# Patient Record
Sex: FEMALE | Race: WHITE | URBAN - METROPOLITAN AREA
[De-identification: names, ages, dates, MRNs, and addresses within clinical notes are randomized per-mention and may not be internally consistent; named-entity substitution may affect disease eponyms.]

---

## 2024-04-05 LAB
ABO, EXTERNAL RESULT: NORMAL
C. TRACHOMATIS, EXTERNAL RESULT: NEGATIVE
HEP B, EXTERNAL RESULT: NEGATIVE
HIV, EXTERNAL RESULT: NORMAL
N. GONORRHOEAE, EXTERNAL RESULT: NEGATIVE
RH FACTOR, EXTERNAL RESULT: POSITIVE
RUBELLA TITER, EXTERNAL RESULT: NORMAL

## 2024-08-06 ENCOUNTER — HOSPITAL ENCOUNTER (OUTPATIENT)
Age: 34
Discharge: HOME | End: 2024-08-06
Attending: OBSTETRICS & GYNECOLOGY | Admitting: OBSTETRICS & GYNECOLOGY
Payer: COMMERCIAL

## 2024-08-06 VITALS
DIASTOLIC BLOOD PRESSURE: 76 MMHG | TEMPERATURE: 98.1 F | RESPIRATION RATE: 20 BRPM | OXYGEN SATURATION: 100 % | SYSTOLIC BLOOD PRESSURE: 124 MMHG | HEART RATE: 88 BPM

## 2024-08-06 LAB
ABO + RH BLD: NORMAL
BLOOD GROUP ANTIBODIES SERPL: NORMAL
ERYTHROCYTE [DISTWIDTH] IN BLOOD BY AUTOMATED COUNT: 13.8 % (ref 11.9–14.6)
HCT VFR BLD AUTO: 36.3 % (ref 35.8–46.3)
HGB BLD-MCNC: 11.9 G/DL (ref 11.7–15.4)
MCH RBC QN AUTO: 27.8 PG (ref 26.1–32.9)
MCHC RBC AUTO-ENTMCNC: 32.8 G/DL (ref 31.4–35)
MCV RBC AUTO: 84.8 FL (ref 82–102)
NRBC # BLD: 0 K/UL (ref 0–0.2)
PLATELET # BLD AUTO: 305 K/UL (ref 150–450)
PMV BLD AUTO: 10.1 FL (ref 9.4–12.3)
RBC # BLD AUTO: 4.28 M/UL (ref 4.05–5.2)
SPECIMEN EXP DATE BLD: NORMAL
T PALLIDUM AB SER QL IA: NONREACTIVE
WBC # BLD AUTO: 10.1 K/UL (ref 4.3–11.1)

## 2024-08-06 PROCEDURE — 6360000002 HC RX W HCPCS

## 2024-08-06 PROCEDURE — 96361 HYDRATE IV INFUSION ADD-ON: CPT

## 2024-08-06 PROCEDURE — 2500000003 HC RX 250 WO HCPCS: Performed by: OBSTETRICS & GYNECOLOGY

## 2024-08-06 PROCEDURE — 86780 TREPONEMA PALLIDUM: CPT

## 2024-08-06 PROCEDURE — 96375 TX/PRO/DX INJ NEW DRUG ADDON: CPT

## 2024-08-06 PROCEDURE — 6360000002 HC RX W HCPCS: Performed by: OBSTETRICS & GYNECOLOGY

## 2024-08-06 PROCEDURE — 59025 FETAL NON-STRESS TEST: CPT

## 2024-08-06 PROCEDURE — 86850 RBC ANTIBODY SCREEN: CPT

## 2024-08-06 PROCEDURE — 59412 ANTEPARTUM MANIPULATION: CPT

## 2024-08-06 PROCEDURE — 86900 BLOOD TYPING SEROLOGIC ABO: CPT

## 2024-08-06 PROCEDURE — 86901 BLOOD TYPING SEROLOGIC RH(D): CPT

## 2024-08-06 PROCEDURE — 96372 THER/PROPH/DIAG INJ SC/IM: CPT

## 2024-08-06 PROCEDURE — 96374 THER/PROPH/DIAG INJ IV PUSH: CPT

## 2024-08-06 PROCEDURE — 85027 COMPLETE CBC AUTOMATED: CPT

## 2024-08-06 PROCEDURE — 36415 COLL VENOUS BLD VENIPUNCTURE: CPT

## 2024-08-06 RX ORDER — TERBUTALINE SULFATE 1 MG/ML
0.25 INJECTION, SOLUTION SUBCUTANEOUS ONCE
Status: COMPLETED | OUTPATIENT
Start: 2024-08-06 | End: 2024-08-06

## 2024-08-06 RX ORDER — SODIUM CHLORIDE, SODIUM LACTATE, POTASSIUM CHLORIDE, CALCIUM CHLORIDE 600; 310; 30; 20 MG/100ML; MG/100ML; MG/100ML; MG/100ML
INJECTION, SOLUTION INTRAVENOUS CONTINUOUS
Status: DISCONTINUED | OUTPATIENT
Start: 2024-08-06 | End: 2024-08-06 | Stop reason: HOSPADM

## 2024-08-06 RX ORDER — MORPHINE SULFATE 2 MG/ML
2 INJECTION, SOLUTION INTRAMUSCULAR; INTRAVENOUS ONCE
Status: COMPLETED | OUTPATIENT
Start: 2024-08-06 | End: 2024-08-06

## 2024-08-06 RX ORDER — ONDANSETRON 2 MG/ML
4 INJECTION INTRAMUSCULAR; INTRAVENOUS ONCE
Status: COMPLETED | OUTPATIENT
Start: 2024-08-06 | End: 2024-08-06

## 2024-08-06 RX ORDER — ESCITALOPRAM OXALATE 20 MG/1
TABLET ORAL DAILY
COMMUNITY

## 2024-08-06 RX ADMIN — ONDANSETRON 4 MG: 2 INJECTION INTRAMUSCULAR; INTRAVENOUS at 13:06

## 2024-08-06 RX ADMIN — MORPHINE SULFATE 2 MG: 2 INJECTION, SOLUTION INTRAMUSCULAR; INTRAVENOUS at 13:07

## 2024-08-06 RX ADMIN — SODIUM CHLORIDE, SODIUM LACTATE, POTASSIUM CHLORIDE, CALCIUM CHLORIDE: 600; 310; 30; 20 INJECTION, SOLUTION INTRAVENOUS at 11:45

## 2024-08-06 RX ADMIN — TERBUTALINE SULFATE 0.25 MG: 1 INJECTION, SOLUTION SUBCUTANEOUS at 13:09

## 2024-08-06 NOTE — OP NOTE
External Cephalic Version Procedure Note     Indications: Chris breech presentation    Pre-operative Diagnosis: Chris Breech Presentation, GDM, CHTN     Post-operative Diagnosis: Same as above including s/p unsuccessful attempt at external cephalic version     Surgeon: Zoila Hicks MD     Assistants: Freda Decker MD     Anesthesia: None     Procedure Details   The patient was seen in labor and delivery. The risks, benefits, complications, treatment options, and expected outcomes were discussed with the patient.  The patient concurred with the proposed plan, giving informed consent. An ultrasound was performed to confirm fetus in chris breech presentation, head on right. Prior to procedure, a NST was performed which was reactive. Terbutaline was administered prior to procedure. Morphine was administered as well to assist with pain control. Again, an ultrasound was performed to identify fetal head and buttocks. With assistance from Dr. Decker, the fetal head and rump had gentle pressure applied to them with attempt at forward roll. The upper body and head moved to left side of abdomen, but buttocks unable to be elevated out of the pelvis to complete roll despite multiple attempts. FHR and location checked during procedure and noted to be reassuring. After multiple attempts, version was deemed to be unsuccessful with continued breech presentation affirmed at the end of the case. Fetal heart rate noted to be within normal limits.      The patient was monitored for at least 30 minutes post-procedure.      Findings:  Pre-procedure - chris breech presentation; post-procedure - chris breech presentation     Complications:  None; patient tolerated the procedure well.           Disposition: Discharged home after period of extended monitoring           Condition: stable

## 2024-08-06 NOTE — H&P
History & Physical    Name: Maris Gage MRN: [id number]  SSN: [id number]    YOB: 1990  Age: 34 y.o.  Sex: female      Subjective:     Estimated Date of Delivery: None noted.  OB History    Para Term  AB Living   1             SAB IAB Ectopic Molar Multiple Live Births                    # Outcome Date GA Lbr Yann/2nd Weight Sex Delivery Anes PTL Lv   1 Current                Ms. Gage is admitted for attempt at ECV, known breech presentation. Pregnancy further complicated by GDM and hypertension. She reports + fetal movement, no obstetric concerns. Denies any bleeding, abnormal discharge, or cramping. Fetus known to be in chris breech presentation.     Past Medical History:   Diagnosis Date    Abnormal Pap smear of cervix     Diabetes mellitus (HCC)     Hypertension     Mental disorder      Past Surgical History:   Procedure Laterality Date    MOUTH SURGERY       Social History     Occupational History    Not on file   Tobacco Use    Smoking status: Never     Passive exposure: Never    Smokeless tobacco: Never   Vaping Use    Vaping Use: Never used   Substance and Sexual Activity    Alcohol use: Not Currently    Drug use: Never    Sexual activity: Yes     History reviewed. No pertinent family history.    Allergies   Allergen Reactions    Dilaudid [Hydromorphone Hcl] Itching     Prior to Admission medications    Medication Sig Start Date End Date Taking? Authorizing Provider   metFORMIN (GLUCOPHAGE) 500 MG tablet Take 1 tablet by mouth 2 times daily (with meals)   Yes Julia Espino MD   Prenatal Vit w/Ep-Rwqlmbvvi-BQ (PNV PO) Take by mouth   Yes Julia Espino MD   escitalopram (LEXAPRO) 20 MG tablet Take by mouth daily   Yes Julia Espino MD   Ferrous Sulfate (SLOW FE PO) Take by mouth   Yes Julia Espino MD        Review of Systems: A comprehensive review of systems was negative except for that written in the History of Present Illness.    Objective:      Vitals:  Vitals:    08/06/24 1129   BP: 127/85   Pulse: 99   Resp: 12   Temp: 98.1 °F (36.7 °C)   TempSrc: Oral   SpO2: 97%        Physical Exam:  General: AAO x 3, no acute complaints  Resp: Non-labored respirations, symmetric expansion  Abd: Soft, gravid, non-tender  Membranes:  Intact  Fetal Heart Rate: Reactive  Baseline: 135 beats per minute  Variability: moderate  Accelerations: yes  Decelerations: none        [place]: No contractions on tocometer    Prenatal Labs:   No results found for: \"ABORH\", \"RUBELLAEXT\", \"HBSAGEXT\", \"HIVEXT\", \"RPREXT\", \"GONNOEXT\"    Impression/Plan:     Principal Problem:    Breech presentation  Resolved Problems:    * No resolved hospital problems. *       Plan: Admit for ECV. R/B/A discussed with patient and consents signed. Plan on morphine 2 mg x 1 as well as terbutaline.

## 2024-08-06 NOTE — PROGRESS NOTES
Dr. Hicks and Dr. Decker at bedside at 1308.   Zofran, morphine, and terbutaline given per orders. See MAR.   Procedure started at 1312.   ECV unsuccessful. Attempt stopped at 1322.  EFM/toco reapplied and tracing well. FHR reassuring.   Patient tolerated well.

## 2024-08-06 NOTE — DISCHARGE INSTRUCTIONS
Section: Before Your Surgery  What is a  section?     A  section, or , is surgery to deliver your baby through a cut the doctor makes in your lower belly and uterus. This cut is also called an incision. In many cases, the doctor makes the cut just above the pubic hairline. In other cases, it runs from the belly button to the pubic hairline. Both cuts leave a scar. It most often fades with time.  The surgery may be done while you are awake but your belly is numb. This lets you be awake for the birth of your baby. Less often, women need general anesthesia. This means you are asleep during the surgery.  Most women go home about 3 days after the birth. You may feel better each day. But you will likely need about 6 weeks to fully recover. During the first few weeks you will need extra help with household chores. But you will be able to care for your baby. You can do things like breastfeed and change diapers.  How do you prepare for surgery?  Surgery can be stressful. This information will help you understand what you can expect. And it will help you safely prepare for surgery.  Preparing for surgery    Be sure you have someone to take you home. Anesthesia and pain medicine will make it unsafe for you to drive or get home on your own.     Understand exactly what surgery is planned, along with the risks, benefits, and other options.     Tell your doctor ALL the medicines, vitamins, supplements, and herbal remedies you take. Some may increase the risk of problems during your surgery. Your doctor will tell you if you should stop taking any of them before the surgery and how soon to do it.     If you take a medicine that prevents blood clots, your doctor may tell you to stop taking it before your surgery. Or your doctor may tell you to keep taking it. (These medicines include aspirin and other blood thinners.) Make sure that you understand exactly what your doctor wants you to do.      the birth. Then it will be ready to go when you are.     Learn about breastfeeding.  For example, find out about ways to hold your baby to make breastfeeding easier. And think about learning how to pump and store milk.     Know that crying is normal.  It's common for babies to cry 1 to 3 hours a day. Some cry more, and some cry less.     Learn why babies cry.  They may be hungry; have gas; need a diaper change; or feel cold, warm, tired, lonely, or tense. Sometimes they cry for unknown reasons.     Think about what will help you stay calm when your baby cries.  Taking slow, deep breaths can help. So can taking a break. It's okay to put your baby somewhere safe (like their crib) and walk away for a few minutes.     Learn about safe sleep for your baby.  Always put your baby to sleep on their back. Place them alone in a crib or bassinet with a firm, flat surface. Keep soft items like stuffed animals out of the crib.     Learn what to expect with  poop.  Your baby will have their own bowel patterns. Some babies have several bowel movements a day. Some have fewer.     Know that  babies will often have loose, yellow bowel movements.  Formula-fed babies have more formed stools. If your baby's poop looks like pellets, your baby is constipated.   Follow-up care is a key part of your treatment and safety. Be sure to make and go to all appointments, and call your doctor if you are having problems. It's also a good idea to know your test results and keep a list of the medicines you take.  Where can you learn more?  Go to https://www.[employer].net/patientEd and enter N257 to learn more about \"Week 37 of Your Pregnancy: Care Instructions.\"  Current as of: July 10, 2023  Content Version: 14.1  © 1349-6923 [employer].   Care instructions adapted under license by [employer]. If you have questions about a medical condition or this instruction, always ask your healthcare professional. [place],

## 2024-08-06 NOTE — PROGRESS NOTES
Discharge orders received. EFM/toco removed. IV removed.   Discharge instructions given to patient and significant other. Understanding verbalized.    Sent home in stable condition with family at side.

## 2024-08-08 ENCOUNTER — HOSPITAL ENCOUNTER (INPATIENT)
Age: 34
LOS: 3 days | Discharge: HOME | End: 2024-08-11
Attending: OBSTETRICS & GYNECOLOGY | Admitting: OBSTETRICS & GYNECOLOGY
Payer: COMMERCIAL

## 2024-08-08 ENCOUNTER — ANESTHESIA (OUTPATIENT)
Dept: OPERATING ROOM | Age: 34
End: 2024-08-08
Payer: COMMERCIAL

## 2024-08-08 ENCOUNTER — ANESTHESIA EVENT (OUTPATIENT)
Dept: OPERATING ROOM | Age: 34
End: 2024-08-08
Payer: COMMERCIAL

## 2024-08-08 PROBLEM — O14.93 PREECLAMPSIA, THIRD TRIMESTER: Status: ACTIVE | Noted: 2024-08-08

## 2024-08-08 LAB
ALBUMIN SERPL-MCNC: 2.5 G/DL (ref 3.5–5)
ALBUMIN/GLOB SERPL: 0.8 (ref 1–1.9)
ALP SERPL-CCNC: 132 U/L (ref 35–104)
ALT SERPL-CCNC: 13 U/L (ref 12–65)
ANION GAP SERPL CALC-SCNC: 17 MMOL/L (ref 9–18)
AST SERPL-CCNC: 15 U/L (ref 15–37)
BASOPHILS # BLD: 0 K/UL (ref 0–0.2)
BASOPHILS NFR BLD: 0 % (ref 0–2)
BILIRUB SERPL-MCNC: <0.2 MG/DL (ref 0–1.2)
BUN SERPL-MCNC: 7 MG/DL (ref 6–23)
CALCIUM SERPL-MCNC: 8.6 MG/DL (ref 8.8–10.2)
CHLORIDE SERPL-SCNC: 104 MMOL/L (ref 98–107)
CO2 SERPL-SCNC: 17 MMOL/L (ref 20–28)
CREAT SERPL-MCNC: 0.41 MG/DL (ref 0.6–1.1)
CREAT UR-MCNC: 70.4 MG/DL (ref 28–217)
DIFFERENTIAL METHOD BLD: ABNORMAL
EOSINOPHIL # BLD: 0.1 K/UL (ref 0–0.8)
EOSINOPHIL NFR BLD: 1 % (ref 0.5–7.8)
ERYTHROCYTE [DISTWIDTH] IN BLOOD BY AUTOMATED COUNT: 13.8 % (ref 11.9–14.6)
GLOBULIN SER CALC-MCNC: 3.1 G/DL (ref 2.3–3.5)
GLUCOSE SERPL-MCNC: 108 MG/DL (ref 70–99)
HCT VFR BLD AUTO: 35.9 % (ref 35.8–46.3)
HGB BLD-MCNC: 11.7 G/DL (ref 11.7–15.4)
IMM GRANULOCYTES # BLD AUTO: 0.1 K/UL (ref 0–0.5)
IMM GRANULOCYTES NFR BLD AUTO: 1 % (ref 0–5)
LYMPHOCYTES # BLD: 1.2 K/UL (ref 0.5–4.6)
LYMPHOCYTES NFR BLD: 10 % (ref 13–44)
MCH RBC QN AUTO: 27.5 PG (ref 26.1–32.9)
MCHC RBC AUTO-ENTMCNC: 32.6 G/DL (ref 31.4–35)
MCV RBC AUTO: 84.5 FL (ref 82–102)
MONOCYTES # BLD: 0.7 K/UL (ref 0.1–1.3)
MONOCYTES NFR BLD: 6 % (ref 4–12)
NEUTS SEG # BLD: 9 K/UL (ref 1.7–8.2)
NEUTS SEG NFR BLD: 82 % (ref 43–78)
NRBC # BLD: 0 K/UL (ref 0–0.2)
PLATELET # BLD AUTO: 270 K/UL (ref 150–450)
PMV BLD AUTO: 9.7 FL (ref 9.4–12.3)
POTASSIUM SERPL-SCNC: 3.9 MMOL/L (ref 3.5–5.1)
PROT SERPL-MCNC: 5.5 G/DL (ref 6.3–8.2)
PROT UR-MCNC: 19 MG/DL
PROT/CREAT UR-RTO: 0.3
RBC # BLD AUTO: 4.25 M/UL (ref 4.05–5.2)
SODIUM SERPL-SCNC: 138 MMOL/L (ref 136–145)
T PALLIDUM AB SER QL IA: NONREACTIVE
URATE SERPL-MCNC: 4.1 MG/DL (ref 2.5–7.1)
WBC # BLD AUTO: 11 K/UL (ref 4.3–11.1)

## 2024-08-08 PROCEDURE — 6360000002 HC RX W HCPCS: Performed by: OBSTETRICS & GYNECOLOGY

## 2024-08-08 PROCEDURE — 84156 ASSAY OF PROTEIN URINE: CPT

## 2024-08-08 PROCEDURE — 6370000000 HC RX 637 (ALT 250 FOR IP): Performed by: ANESTHESIOLOGY

## 2024-08-08 PROCEDURE — 84550 ASSAY OF BLOOD/URIC ACID: CPT

## 2024-08-08 PROCEDURE — 2500000003 HC RX 250 WO HCPCS: Performed by: OBSTETRICS & GYNECOLOGY

## 2024-08-08 PROCEDURE — 6360000002 HC RX W HCPCS: Performed by: NURSE ANESTHETIST, CERTIFIED REGISTERED

## 2024-08-08 PROCEDURE — 2709999900 HC NON-CHARGEABLE SUPPLY: Performed by: OBSTETRICS & GYNECOLOGY

## 2024-08-08 PROCEDURE — 3700000000 HC ANESTHESIA ATTENDED CARE: Performed by: OBSTETRICS & GYNECOLOGY

## 2024-08-08 PROCEDURE — 6360000002 HC RX W HCPCS: Performed by: ANESTHESIOLOGY

## 2024-08-08 PROCEDURE — 80053 COMPREHEN METABOLIC PANEL: CPT

## 2024-08-08 PROCEDURE — 4A1HXCZ MONITORING OF PRODUCTS OF CONCEPTION, CARDIAC RATE, EXTERNAL APPROACH: ICD-10-PCS | Performed by: OBSTETRICS & GYNECOLOGY

## 2024-08-08 PROCEDURE — 2500000003 HC RX 250 WO HCPCS: Performed by: NURSE ANESTHETIST, CERTIFIED REGISTERED

## 2024-08-08 PROCEDURE — 2580000003 HC RX 258: Performed by: OBSTETRICS & GYNECOLOGY

## 2024-08-08 PROCEDURE — 86780 TREPONEMA PALLIDUM: CPT

## 2024-08-08 PROCEDURE — 7100000001 HC PACU RECOVERY - ADDTL 15 MIN: Performed by: OBSTETRICS & GYNECOLOGY

## 2024-08-08 PROCEDURE — 3700000001 HC ADD 15 MINUTES (ANESTHESIA): Performed by: OBSTETRICS & GYNECOLOGY

## 2024-08-08 PROCEDURE — 1100000000 HC RM PRIVATE

## 2024-08-08 PROCEDURE — 85025 COMPLETE CBC W/AUTO DIFF WBC: CPT

## 2024-08-08 PROCEDURE — 82570 ASSAY OF URINE CREATININE: CPT

## 2024-08-08 PROCEDURE — 6370000000 HC RX 637 (ALT 250 FOR IP): Performed by: OBSTETRICS & GYNECOLOGY

## 2024-08-08 PROCEDURE — 2580000003 HC RX 258: Performed by: NURSE ANESTHETIST, CERTIFIED REGISTERED

## 2024-08-08 PROCEDURE — 99465 NB RESUSCITATION: CPT

## 2024-08-08 PROCEDURE — 3609079900 HC CESAREAN SECTION: Performed by: OBSTETRICS & GYNECOLOGY

## 2024-08-08 PROCEDURE — 6360000002 HC RX W HCPCS

## 2024-08-08 PROCEDURE — 7100000000 HC PACU RECOVERY - FIRST 15 MIN: Performed by: OBSTETRICS & GYNECOLOGY

## 2024-08-08 RX ORDER — SODIUM CHLORIDE 0.9 % (FLUSH) 0.9 %
10 SYRINGE (ML) INJECTION PRN
Status: DISCONTINUED | OUTPATIENT
Start: 2024-08-08 | End: 2024-08-09

## 2024-08-08 RX ORDER — OXYCODONE HYDROCHLORIDE 5 MG/1
5 TABLET ORAL EVERY 4 HOURS PRN
Status: DISCONTINUED | OUTPATIENT
Start: 2024-08-08 | End: 2024-08-09

## 2024-08-08 RX ORDER — EPHEDRINE SULFATE/0.9% NACL/PF 50 MG/5 ML
SYRINGE (ML) INTRAVENOUS PRN
Status: DISCONTINUED | OUTPATIENT
Start: 2024-08-08 | End: 2024-08-08 | Stop reason: SDUPTHER

## 2024-08-08 RX ORDER — CITRIC ACID/SODIUM CITRATE 334-500MG
30 SOLUTION, ORAL ORAL ONCE
Status: COMPLETED | OUTPATIENT
Start: 2024-08-08 | End: 2024-08-08

## 2024-08-08 RX ORDER — MORPHINE SULFATE 1 MG/ML
INJECTION, SOLUTION EPIDURAL; INTRATHECAL; INTRAVENOUS
Status: COMPLETED | OUTPATIENT
Start: 2024-08-08 | End: 2024-08-08

## 2024-08-08 RX ORDER — ACETAMINOPHEN 325 MG/1
650 TABLET ORAL EVERY 4 HOURS PRN
Status: DISCONTINUED | OUTPATIENT
Start: 2024-08-08 | End: 2024-08-09

## 2024-08-08 RX ORDER — ACETAMINOPHEN 325 MG/1
975 TABLET ORAL ONCE
Status: COMPLETED | OUTPATIENT
Start: 2024-08-08 | End: 2024-08-08

## 2024-08-08 RX ORDER — ONDANSETRON 2 MG/ML
4 INJECTION INTRAMUSCULAR; INTRAVENOUS EVERY 6 HOURS PRN
Status: DISCONTINUED | OUTPATIENT
Start: 2024-08-08 | End: 2024-08-09

## 2024-08-08 RX ORDER — SODIUM CHLORIDE 0.9 % (FLUSH) 0.9 %
5-40 SYRINGE (ML) INJECTION EVERY 12 HOURS SCHEDULED
Status: DISCONTINUED | OUTPATIENT
Start: 2024-08-08 | End: 2024-08-09

## 2024-08-08 RX ORDER — SODIUM CHLORIDE, SODIUM LACTATE, POTASSIUM CHLORIDE, CALCIUM CHLORIDE 600; 310; 30; 20 MG/100ML; MG/100ML; MG/100ML; MG/100ML
INJECTION, SOLUTION INTRAVENOUS CONTINUOUS PRN
Status: DISCONTINUED | OUTPATIENT
Start: 2024-08-08 | End: 2024-08-08 | Stop reason: SDUPTHER

## 2024-08-08 RX ORDER — NALBUPHINE HYDROCHLORIDE 10 MG/ML
5 INJECTION, SOLUTION INTRAMUSCULAR; INTRAVENOUS; SUBCUTANEOUS EVERY 4 HOURS PRN
Status: DISCONTINUED | OUTPATIENT
Start: 2024-08-08 | End: 2024-08-09

## 2024-08-08 RX ORDER — KETOROLAC TROMETHAMINE 30 MG/ML
INJECTION, SOLUTION INTRAMUSCULAR; INTRAVENOUS PRN
Status: DISCONTINUED | OUTPATIENT
Start: 2024-08-08 | End: 2024-08-08 | Stop reason: SDUPTHER

## 2024-08-08 RX ORDER — HYDROMORPHONE HYDROCHLORIDE 1 MG/ML
0.25 INJECTION, SOLUTION INTRAMUSCULAR; INTRAVENOUS; SUBCUTANEOUS EVERY 6 HOURS PRN
Status: DISCONTINUED | OUTPATIENT
Start: 2024-08-08 | End: 2024-08-09

## 2024-08-08 RX ORDER — ONDANSETRON 2 MG/ML
4 INJECTION INTRAMUSCULAR; INTRAVENOUS ONCE
Status: COMPLETED | OUTPATIENT
Start: 2024-08-08 | End: 2024-08-08

## 2024-08-08 RX ORDER — KETOROLAC TROMETHAMINE 30 MG/ML
15 INJECTION, SOLUTION INTRAMUSCULAR; INTRAVENOUS EVERY 6 HOURS PRN
Status: DISCONTINUED | OUTPATIENT
Start: 2024-08-08 | End: 2024-08-09

## 2024-08-08 RX ORDER — OXYCODONE HYDROCHLORIDE 5 MG/1
10 TABLET ORAL EVERY 4 HOURS PRN
Status: DISCONTINUED | OUTPATIENT
Start: 2024-08-08 | End: 2024-08-09

## 2024-08-08 RX ORDER — BUPIVACAINE HYDROCHLORIDE 7.5 MG/ML
INJECTION, SOLUTION INTRASPINAL
Status: COMPLETED | OUTPATIENT
Start: 2024-08-08 | End: 2024-08-08

## 2024-08-08 RX ORDER — NALOXONE HYDROCHLORIDE 0.4 MG/ML
INJECTION, SOLUTION INTRAMUSCULAR; INTRAVENOUS; SUBCUTANEOUS PRN
Status: DISCONTINUED | OUTPATIENT
Start: 2024-08-08 | End: 2024-08-09

## 2024-08-08 RX ORDER — PROCHLORPERAZINE EDISYLATE 5 MG/ML
INJECTION INTRAMUSCULAR; INTRAVENOUS PRN
Status: DISCONTINUED | OUTPATIENT
Start: 2024-08-08 | End: 2024-08-08 | Stop reason: SDUPTHER

## 2024-08-08 RX ORDER — SODIUM CHLORIDE 9 MG/ML
INJECTION, SOLUTION INTRAVENOUS PRN
Status: DISCONTINUED | OUTPATIENT
Start: 2024-08-08 | End: 2024-08-09

## 2024-08-08 RX ORDER — SODIUM CHLORIDE, SODIUM LACTATE, POTASSIUM CHLORIDE, AND CALCIUM CHLORIDE .6; .31; .03; .02 G/100ML; G/100ML; G/100ML; G/100ML
1000 INJECTION, SOLUTION INTRAVENOUS ONCE
Status: DISCONTINUED | OUTPATIENT
Start: 2024-08-08 | End: 2024-08-09

## 2024-08-08 RX ORDER — SODIUM CHLORIDE, SODIUM LACTATE, POTASSIUM CHLORIDE, CALCIUM CHLORIDE 600; 310; 30; 20 MG/100ML; MG/100ML; MG/100ML; MG/100ML
INJECTION, SOLUTION INTRAVENOUS CONTINUOUS
Status: DISCONTINUED | OUTPATIENT
Start: 2024-08-08 | End: 2024-08-09

## 2024-08-08 RX ADMIN — KETOROLAC TROMETHAMINE 30 MG: 30 INJECTION, SOLUTION INTRAMUSCULAR; INTRAVENOUS at 14:36

## 2024-08-08 RX ADMIN — FAMOTIDINE 20 MG: 10 INJECTION, SOLUTION INTRAVENOUS at 13:26

## 2024-08-08 RX ADMIN — HYDROMORPHONE HYDROCHLORIDE 0.25 MG: 1 INJECTION, SOLUTION INTRAMUSCULAR; INTRAVENOUS; SUBCUTANEOUS at 16:18

## 2024-08-08 RX ADMIN — Medication 10 MG: at 14:22

## 2024-08-08 RX ADMIN — Medication 10 MG: at 14:31

## 2024-08-08 RX ADMIN — MORPHINE SULFATE 0.15 MG: 1 INJECTION, SOLUTION EPIDURAL; INTRATHECAL; INTRAVENOUS at 13:54

## 2024-08-08 RX ADMIN — SODIUM CHLORIDE, POTASSIUM CHLORIDE, SODIUM LACTATE AND CALCIUM CHLORIDE: 600; 310; 30; 20 INJECTION, SOLUTION INTRAVENOUS at 15:38

## 2024-08-08 RX ADMIN — PHENYLEPHRINE HYDROCHLORIDE 100 MCG: 0.1 INJECTION, SOLUTION INTRAVENOUS at 14:31

## 2024-08-08 RX ADMIN — Medication 500 ML/HR: at 14:13

## 2024-08-08 RX ADMIN — PHENYLEPHRINE HYDROCHLORIDE 100 MCG: 0.1 INJECTION, SOLUTION INTRAVENOUS at 14:06

## 2024-08-08 RX ADMIN — PHENYLEPHRINE HYDROCHLORIDE 100 MCG: 0.1 INJECTION, SOLUTION INTRAVENOUS at 14:23

## 2024-08-08 RX ADMIN — PHENYLEPHRINE HYDROCHLORIDE 100 MCG: 0.1 INJECTION, SOLUTION INTRAVENOUS at 14:20

## 2024-08-08 RX ADMIN — PHENYLEPHRINE HYDROCHLORIDE 100 MCG: 0.1 INJECTION, SOLUTION INTRAVENOUS at 14:02

## 2024-08-08 RX ADMIN — SODIUM CHLORIDE, SODIUM LACTATE, POTASSIUM CHLORIDE, AND CALCIUM CHLORIDE: 600; 310; 30; 20 INJECTION, SOLUTION INTRAVENOUS at 13:49

## 2024-08-08 RX ADMIN — SODIUM CHLORIDE, SODIUM LACTATE, POTASSIUM CHLORIDE, AND CALCIUM CHLORIDE 1000 ML: .6; .31; .03; .02 INJECTION, SOLUTION INTRAVENOUS at 11:17

## 2024-08-08 RX ADMIN — ACETAMINOPHEN 975 MG: 325 TABLET, FILM COATED ORAL at 13:25

## 2024-08-08 RX ADMIN — Medication 10 MG: at 14:06

## 2024-08-08 RX ADMIN — CEFAZOLIN 2000 MG: 10 INJECTION, POWDER, FOR SOLUTION INTRAVENOUS at 13:44

## 2024-08-08 RX ADMIN — PHENYLEPHRINE HYDROCHLORIDE 100 MCG: 0.1 INJECTION, SOLUTION INTRAVENOUS at 14:16

## 2024-08-08 RX ADMIN — Medication 10 MG: at 14:13

## 2024-08-08 RX ADMIN — ONDANSETRON 4 MG: 2 INJECTION INTRAMUSCULAR; INTRAVENOUS at 13:26

## 2024-08-08 RX ADMIN — PHENYLEPHRINE HYDROCHLORIDE 100 MCG: 0.1 INJECTION, SOLUTION INTRAVENOUS at 14:09

## 2024-08-08 RX ADMIN — ACETAMINOPHEN 650 MG: 325 TABLET, FILM COATED ORAL at 21:18

## 2024-08-08 RX ADMIN — PHENYLEPHRINE HYDROCHLORIDE 100 MCG: 0.1 INJECTION, SOLUTION INTRAVENOUS at 14:00

## 2024-08-08 RX ADMIN — Medication 10 MG: at 14:00

## 2024-08-08 RX ADMIN — KETOROLAC TROMETHAMINE 15 MG: 30 INJECTION, SOLUTION INTRAMUSCULAR; INTRAVENOUS at 21:17

## 2024-08-08 RX ADMIN — PHENYLEPHRINE HYDROCHLORIDE 100 MCG: 0.1 INJECTION, SOLUTION INTRAVENOUS at 14:28

## 2024-08-08 RX ADMIN — PHENYLEPHRINE HYDROCHLORIDE 100 MCG: 0.1 INJECTION, SOLUTION INTRAVENOUS at 14:12

## 2024-08-08 RX ADMIN — SODIUM CITRATE AND CITRIC ACID MONOHYDRATE 30 ML: 334; 500 SOLUTION ORAL at 13:26

## 2024-08-08 RX ADMIN — PROCHLORPERAZINE EDISYLATE 5 MG: 5 INJECTION INTRAMUSCULAR; INTRAVENOUS at 14:14

## 2024-08-08 RX ADMIN — BUPIVACAINE HYDROCHLORIDE IN DEXTROSE 13.5 MG: 7.5 INJECTION, SOLUTION SUBARACHNOID at 13:54

## 2024-08-08 RX ADMIN — OXYCODONE 10 MG: 5 TABLET ORAL at 18:25

## 2024-08-08 ASSESSMENT — PAIN DESCRIPTION - ORIENTATION: ORIENTATION: MID

## 2024-08-08 ASSESSMENT — PAIN SCALES - GENERAL
PAINLEVEL_OUTOF10: 5
PAINLEVEL_OUTOF10: 6

## 2024-08-08 ASSESSMENT — PAIN DESCRIPTION - LOCATION
LOCATION: ABDOMEN
LOCATION: ABDOMEN

## 2024-08-08 ASSESSMENT — PAIN DESCRIPTION - DESCRIPTORS: DESCRIPTORS: ACHING

## 2024-08-08 NOTE — ANESTHESIA PRE PROCEDURE
Department of Anesthesiology  Preprocedure Note       Name:  Maris Gage   Age:  34 y.o.  :  1990                                          MRN:  [id number]         Date:  2024      Surgeon: Surgeon(s):  Kareem Barnard MD    Procedure: Procedure(s):   SECTION    Medications prior to admission:   Prior to Admission medications    Medication Sig Start Date End Date Taking? Authorizing Provider   metFORMIN (GLUCOPHAGE) 500 MG tablet Take 1 tablet by mouth 2 times daily (with meals)  Patient not taking: Reported on 2024    ProviderJulia MD   Prenatal Vit w/Ex-Lsnxmndwu-XP (PNV PO) Take by mouth    Julia Espino MD   escitalopram (LEXAPRO) 20 MG tablet Take by mouth daily    Julia Espino MD   Ferrous Sulfate (SLOW FE PO) Take by mouth    Julia Espino MD       Current medications:    Current Facility-Administered Medications   Medication Dose Route Frequency Provider Last Rate Last Admin    lactated ringers bolus 1,000 mL  1,000 mL IntraVENous Once Jason Jaquez MD        sodium chloride flush 0.9 % injection 5-40 mL  5-40 mL IntraVENous 2 times per day Jason Jaquez MD        sodium chloride flush 0.9 % injection 10 mL  10 mL IntraVENous PRN Jason Jaquez MD        0.9 % sodium chloride infusion   IntraVENous PRN Jason Jaquez MD        famotidine (PEPCID) 20 mg in sodium chloride (PF) 0.9 % 10 mL injection  20 mg IntraVENous Once Jason Jaquez MD        acetaminophen (TYLENOL) tablet 975 mg  975 mg Oral Once Jason Jaquez MD        ceFAZolin (ANCEF) 2000 mg in sterile water 20 mL IV syringe  2,000 mg IntraVENous Once Jason Jaquez MD           Allergies:    Allergies   Allergen Reactions    Dilaudid [Hydromorphone Hcl] Itching       Problem List:    Patient Active Problem List   Diagnosis Code    Breech presentation O32.1XX0    Preeclampsia, third trimester O14.93       Past Medical History:        Diagnosis Date    Abnormal Pap smear of cervix

## 2024-08-08 NOTE — H&P
History & Physical    Name: Maris Gage MRN: [id number]  SSN: [id number]    YOB: 1990  Age: 34 y.o.  Sex: female      Subjective:     Estimated Date of Delivery: 24  OB History    Para Term  AB Living   1             SAB IAB Ectopic Molar Multiple Live Births                    # Outcome Date GA Lbr Yann/2nd Weight Sex Delivery Anes PTL Lv   1 Current                Ms. Gage admitted with pregnancy at 37w3d for  section due to superimposed PIH. Prenatal course was complicated by diabetes - gestational. Please see prenatal records for details. Of note Maris had BARRAZA and vision changes yesterday that resolved but c/o nausea today.    Past Medical History:   Diagnosis Date    Abnormal Pap smear of cervix     Diabetes mellitus (HCC)     Hypertension     Mental disorder      Past Surgical History:   Procedure Laterality Date    MOUTH SURGERY       Social History     Occupational History    Not on file   Tobacco Use    Smoking status: Never     Passive exposure: Never    Smokeless tobacco: Never   Vaping Use    Vaping Use: Never used   Substance and Sexual Activity    Alcohol use: Not Currently    Drug use: Never    Sexual activity: Yes     History reviewed. No pertinent family history.    Allergies   Allergen Reactions    Dilaudid [Hydromorphone Hcl] Itching     Prior to Admission medications    Medication Sig Start Date End Date Taking? Authorizing Provider   metFORMIN (GLUCOPHAGE) 500 MG tablet Take 1 tablet by mouth 2 times daily (with meals)    Julia Espino MD   Prenatal Vit w/Re-Oqoxexelr-CQ (PNV PO) Take by mouth    Julia Espino MD   escitalopram (LEXAPRO) 20 MG tablet Take by mouth daily    Julia Espino MD   Ferrous Sulfate (SLOW FE PO) Take by mouth    ProviderJulia MD        Review of Systems: A comprehensive review of systems was negative except for that written in the History of Present Illness.    Objective:     Vitals:  Vitals:

## 2024-08-08 NOTE — ANESTHESIA PROCEDURE NOTES
Spinal Block    Patient location during procedure: OB  End time: 8/8/2024 1:56 PM  Reason for block: primary anesthetic and at surgeon's request  Staffing  Performed: anesthesiologist   Anesthesiologist: Ama Nevarez MD  Performed by: Ama Nevarez MD  Authorized by: Ama Nevarez MD    Spinal Block  Patient position: sitting  Prep: ChloraPrep  Patient monitoring: cardiac monitor, continuous pulse ox, frequent blood pressure checks and oxygen  Location: L4/L5  Provider prep: mask and sterile gloves  Needle  Needle type: Pencan   Needle gauge: 25 G  Needle length: 3.5 in  Assessment  Swirl obtained: Yes  CSF: clear  Attempts: 1  Preanesthetic Checklist  Completed: patient identified, IV checked, site marked, risks and benefits discussed, surgical/procedural consents, equipment checked, pre-op evaluation, timeout performed, anesthesia consent given, oxygen available and monitors applied/VS acknowledged

## 2024-08-08 NOTE — PROGRESS NOTES
SBAR OUT Report: Mother    Verbal report given to Zee Troncoso RN on this patient, who is now being transferred to [facility] (unit) for routine progression of patient care.   The patient is  wearing a green \"Anesthesia-Duramorph\" band.    Report consisted of patient's Situation, Background, Assessment and Recommendations (SBAR).       [place] ID bands were compared with the identification form, and verified with the patient and receiving nurse.    Information from the ED SBAR and the [place] Report was reviewed with the receiving nurse; opportunity for questions and clarification provided.

## 2024-08-08 NOTE — PROGRESS NOTES
Pt to L&D with reports of decreased fetal movement. Pt denies vaginal bleeding, loss of fluid, ctx, H/A, and blurry vision. Pt denies good fetal movement. EFM and TOCO applied to a soft, non-tender abd. MD notified.

## 2024-08-08 NOTE — L&D DELIVERY NOTE
Delivery of Alejandra, weight 7-2    Too, Baby GIRL Maris [[id number]]      Labor Events     Labor: No   Steroids: None  Cervical Ripening Date/Time:      Antibiotics Received during Labor: No  Rupture Date/Time:  24 14:12:10   Rupture Type: Intact, AROM  Fluid Color: Meconium  Meconium Consistency: Thin  Fluid Odor: None  Fluid Volume: None  Induction: None  Augmentation: None  Labor Complications: None              Anesthesia    Method: Spinal       Labor Length    3rd stage: 0h 00m       Delivery Details      Delivery Date: 24 Delivery Time: 14:12:28   Delivery Type: , Low Transverse  Trial of Labor?: No   Categorization: Primary   Priority: unscheduled  Indications for : Breech       Skin Incision Type: Pfannenstiel  Uterine Incision: Low Transverse       [place] Presentation    Presentation: Breech       Shoulder Dystocia    Shoulder Dystocia Present?: No       Assisted Delivery Details    Forceps Attempted?: No  Vacuum Extractor Attempted?: No                           Cord    Vessels: 3 Vessels  Complications: None  Cord Clamped Date/Time: 2024 14:13:00  Cord Blood Disposition: Lab  Gases Sent?: No              Placenta    Date/Time: 2024 14:13:00  Removal: Manual Removal  Appearance: Intact  Disposition: Discarded       Lacerations           Vaginal Counts    Initial Count Personnel: N/A  Initial Count Verified By: N/A  Final Count Personnel: N/A  Final Count Verified By: N/A       Blood Loss  Mother: Maris Gage #[id number]     Start of Mother's Information      Delivery Blood Loss  24 1350 - 24 1501      None                 End of Mother's Information  Mother: Maris Gage #[id number]                Delivery Providers    Delivering clinician: Kareem Barnard MD     Provider Role    Kareem Barnard MD Obstetrician    Claudia Betancourt, CARIDAD Primary Nurse    Amelia Woodard RN Primary  Nurse    Eric Bates

## 2024-08-09 ENCOUNTER — ANESTHESIA EVENT (OUTPATIENT)
Dept: MOTHER INFANT UNIT | Age: 34
End: 2024-08-09
Payer: COMMERCIAL

## 2024-08-09 ENCOUNTER — ANESTHESIA (OUTPATIENT)
Dept: MOTHER INFANT UNIT | Age: 34
End: 2024-08-09
Payer: COMMERCIAL

## 2024-08-09 LAB
ERYTHROCYTE [DISTWIDTH] IN BLOOD BY AUTOMATED COUNT: 14.1 % (ref 11.9–14.6)
HCT VFR BLD AUTO: 30.2 % (ref 35.8–46.3)
HGB BLD-MCNC: 9.5 G/DL (ref 11.7–15.4)
MCH RBC QN AUTO: 27.3 PG (ref 26.1–32.9)
MCHC RBC AUTO-ENTMCNC: 31.5 G/DL (ref 31.4–35)
MCV RBC AUTO: 86.8 FL (ref 82–102)
NRBC # BLD: 0 K/UL (ref 0–0.2)
PLATELET # BLD AUTO: 193 K/UL (ref 150–450)
PMV BLD AUTO: 9.5 FL (ref 9.4–12.3)
RBC # BLD AUTO: 3.48 M/UL (ref 4.05–5.2)
WBC # BLD AUTO: 6.2 K/UL (ref 4.3–11.1)

## 2024-08-09 PROCEDURE — 85027 COMPLETE CBC AUTOMATED: CPT

## 2024-08-09 PROCEDURE — 1100000000 HC RM PRIVATE

## 2024-08-09 PROCEDURE — 6370000000 HC RX 637 (ALT 250 FOR IP): Performed by: OBSTETRICS & GYNECOLOGY

## 2024-08-09 PROCEDURE — 6360000002 HC RX W HCPCS: Performed by: ANESTHESIOLOGY

## 2024-08-09 PROCEDURE — 36415 COLL VENOUS BLD VENIPUNCTURE: CPT

## 2024-08-09 PROCEDURE — 6370000000 HC RX 637 (ALT 250 FOR IP): Performed by: ANESTHESIOLOGY

## 2024-08-09 RX ORDER — SODIUM CHLORIDE 9 MG/ML
INJECTION, SOLUTION INTRAVENOUS PRN
Status: DISCONTINUED | OUTPATIENT
Start: 2024-08-09 | End: 2024-08-09

## 2024-08-09 RX ORDER — SODIUM CHLORIDE 0.9 % (FLUSH) 0.9 %
5-40 SYRINGE (ML) INJECTION EVERY 12 HOURS SCHEDULED
Status: DISCONTINUED | OUTPATIENT
Start: 2024-08-09 | End: 2024-08-09

## 2024-08-09 RX ORDER — MIDAZOLAM HYDROCHLORIDE 2 MG/2ML
2 INJECTION, SOLUTION INTRAMUSCULAR; INTRAVENOUS
Status: DISCONTINUED | OUTPATIENT
Start: 2024-08-09 | End: 2024-08-09

## 2024-08-09 RX ORDER — SODIUM CHLORIDE, SODIUM LACTATE, POTASSIUM CHLORIDE, CALCIUM CHLORIDE 600; 310; 30; 20 MG/100ML; MG/100ML; MG/100ML; MG/100ML
INJECTION, SOLUTION INTRAVENOUS CONTINUOUS
Status: DISCONTINUED | OUTPATIENT
Start: 2024-08-09 | End: 2024-08-09

## 2024-08-09 RX ORDER — SODIUM CHLORIDE 0.9 % (FLUSH) 0.9 %
5-40 SYRINGE (ML) INJECTION EVERY 12 HOURS SCHEDULED
Status: DISCONTINUED | OUTPATIENT
Start: 2024-08-09 | End: 2024-08-11 | Stop reason: HOSPADM

## 2024-08-09 RX ORDER — FAMOTIDINE 20 MG/1
20 TABLET, FILM COATED ORAL ONCE
Status: DISCONTINUED | OUTPATIENT
Start: 2024-08-09 | End: 2024-08-09

## 2024-08-09 RX ORDER — LANOLIN
CREAM (ML) TOPICAL
Status: DISCONTINUED | OUTPATIENT
Start: 2024-08-09 | End: 2024-08-11 | Stop reason: HOSPADM

## 2024-08-09 RX ORDER — SODIUM CHLORIDE 9 MG/ML
INJECTION, SOLUTION INTRAVENOUS PRN
Status: DISCONTINUED | OUTPATIENT
Start: 2024-08-09 | End: 2024-08-11 | Stop reason: HOSPADM

## 2024-08-09 RX ORDER — DIPHENHYDRAMINE HCL 25 MG
25 CAPSULE ORAL EVERY 6 HOURS PRN
Status: DISCONTINUED | OUTPATIENT
Start: 2024-08-09 | End: 2024-08-11 | Stop reason: HOSPADM

## 2024-08-09 RX ORDER — ACETAMINOPHEN 500 MG
1000 TABLET ORAL ONCE
Status: DISCONTINUED | OUTPATIENT
Start: 2024-08-09 | End: 2024-08-09

## 2024-08-09 RX ORDER — LIDOCAINE HYDROCHLORIDE 10 MG/ML
1 INJECTION, SOLUTION INFILTRATION; PERINEURAL
Status: DISCONTINUED | OUTPATIENT
Start: 2024-08-09 | End: 2024-08-09

## 2024-08-09 RX ORDER — SODIUM CHLORIDE 0.9 % (FLUSH) 0.9 %
5-40 SYRINGE (ML) INJECTION PRN
Status: DISCONTINUED | OUTPATIENT
Start: 2024-08-09 | End: 2024-08-09

## 2024-08-09 RX ORDER — DOCUSATE SODIUM 100 MG/1
100 CAPSULE, LIQUID FILLED ORAL 2 TIMES DAILY
Status: DISCONTINUED | OUTPATIENT
Start: 2024-08-09 | End: 2024-08-11 | Stop reason: HOSPADM

## 2024-08-09 RX ORDER — ONDANSETRON 2 MG/ML
4 INJECTION INTRAMUSCULAR; INTRAVENOUS EVERY 6 HOURS PRN
Status: DISCONTINUED | OUTPATIENT
Start: 2024-08-09 | End: 2024-08-11 | Stop reason: HOSPADM

## 2024-08-09 RX ORDER — IBUPROFEN 800 MG/1
800 TABLET ORAL EVERY 8 HOURS
Status: DISCONTINUED | OUTPATIENT
Start: 2024-08-09 | End: 2024-08-11 | Stop reason: HOSPADM

## 2024-08-09 RX ORDER — IBUPROFEN 800 MG/1
800 TABLET ORAL EVERY 8 HOURS
Status: DISCONTINUED | OUTPATIENT
Start: 2024-08-10 | End: 2024-08-09

## 2024-08-09 RX ORDER — ONDANSETRON 2 MG/ML
4 INJECTION INTRAMUSCULAR; INTRAVENOUS EVERY 6 HOURS PRN
Status: DISCONTINUED | OUTPATIENT
Start: 2024-08-09 | End: 2024-08-09

## 2024-08-09 RX ORDER — SODIUM CHLORIDE 9 MG/ML
INJECTION, SOLUTION INTRAVENOUS CONTINUOUS
Status: DISCONTINUED | OUTPATIENT
Start: 2024-08-09 | End: 2024-08-09

## 2024-08-09 RX ORDER — OXYCODONE HYDROCHLORIDE 5 MG/1
5 TABLET ORAL EVERY 4 HOURS PRN
Status: DISCONTINUED | OUTPATIENT
Start: 2024-08-09 | End: 2024-08-11 | Stop reason: HOSPADM

## 2024-08-09 RX ORDER — KETOROLAC TROMETHAMINE 30 MG/ML
30 INJECTION, SOLUTION INTRAMUSCULAR; INTRAVENOUS EVERY 6 HOURS
Status: DISCONTINUED | OUTPATIENT
Start: 2024-08-09 | End: 2024-08-09

## 2024-08-09 RX ORDER — FENTANYL CITRATE 50 UG/ML
100 INJECTION, SOLUTION INTRAMUSCULAR; INTRAVENOUS
Status: DISCONTINUED | OUTPATIENT
Start: 2024-08-09 | End: 2024-08-09

## 2024-08-09 RX ORDER — ONDANSETRON 4 MG/1
4 TABLET, ORALLY DISINTEGRATING ORAL EVERY 8 HOURS PRN
Status: DISCONTINUED | OUTPATIENT
Start: 2024-08-09 | End: 2024-08-11 | Stop reason: HOSPADM

## 2024-08-09 RX ORDER — PRENATAL VIT/IRON FUM/FOLIC AC 27MG-0.8MG
1 TABLET ORAL DAILY
Status: DISCONTINUED | OUTPATIENT
Start: 2024-08-09 | End: 2024-08-11 | Stop reason: HOSPADM

## 2024-08-09 RX ORDER — SODIUM CHLORIDE 0.9 % (FLUSH) 0.9 %
5-40 SYRINGE (ML) INJECTION PRN
Status: DISCONTINUED | OUTPATIENT
Start: 2024-08-09 | End: 2024-08-11 | Stop reason: HOSPADM

## 2024-08-09 RX ORDER — SODIUM CHLORIDE, SODIUM LACTATE, POTASSIUM CHLORIDE, CALCIUM CHLORIDE 600; 310; 30; 20 MG/100ML; MG/100ML; MG/100ML; MG/100ML
INJECTION, SOLUTION INTRAVENOUS CONTINUOUS
Status: DISCONTINUED | OUTPATIENT
Start: 2024-08-09 | End: 2024-08-11 | Stop reason: HOSPADM

## 2024-08-09 RX ORDER — OXYCODONE HYDROCHLORIDE 5 MG/1
10 TABLET ORAL EVERY 4 HOURS PRN
Status: DISCONTINUED | OUTPATIENT
Start: 2024-08-09 | End: 2024-08-11 | Stop reason: HOSPADM

## 2024-08-09 RX ORDER — ACETAMINOPHEN 500 MG
1000 TABLET ORAL EVERY 6 HOURS SCHEDULED
Status: DISCONTINUED | OUTPATIENT
Start: 2024-08-09 | End: 2024-08-11 | Stop reason: HOSPADM

## 2024-08-09 RX ADMIN — KETOROLAC TROMETHAMINE 15 MG: 30 INJECTION, SOLUTION INTRAMUSCULAR; INTRAVENOUS at 13:05

## 2024-08-09 RX ADMIN — OXYCODONE 10 MG: 5 TABLET ORAL at 18:27

## 2024-08-09 RX ADMIN — OXYCODONE 5 MG: 5 TABLET ORAL at 08:51

## 2024-08-09 RX ADMIN — IBUPROFEN 800 MG: 800 TABLET, FILM COATED ORAL at 21:24

## 2024-08-09 RX ADMIN — OXYCODONE 10 MG: 5 TABLET ORAL at 22:00

## 2024-08-09 RX ADMIN — KETOROLAC TROMETHAMINE 15 MG: 30 INJECTION, SOLUTION INTRAMUSCULAR; INTRAVENOUS at 05:53

## 2024-08-09 RX ADMIN — OXYCODONE 10 MG: 5 TABLET ORAL at 14:47

## 2024-08-09 RX ADMIN — OXYCODONE 5 MG: 5 TABLET ORAL at 10:02

## 2024-08-09 RX ADMIN — ACETAMINOPHEN 650 MG: 325 TABLET, FILM COATED ORAL at 01:07

## 2024-08-09 RX ADMIN — OXYCODONE 5 MG: 5 TABLET ORAL at 01:07

## 2024-08-09 RX ADMIN — ACETAMINOPHEN 1000 MG: 500 TABLET, FILM COATED ORAL at 18:26

## 2024-08-09 RX ADMIN — ACETAMINOPHEN 650 MG: 325 TABLET, FILM COATED ORAL at 05:53

## 2024-08-09 RX ADMIN — DOCUSATE SODIUM 100 MG: 100 CAPSULE, LIQUID FILLED ORAL at 21:24

## 2024-08-09 ASSESSMENT — PAIN SCALES - GENERAL
PAINLEVEL_OUTOF10: 7
PAINLEVEL_OUTOF10: 8
PAINLEVEL_OUTOF10: 7
PAINLEVEL_OUTOF10: 7
PAINLEVEL_OUTOF10: 5

## 2024-08-09 ASSESSMENT — PAIN DESCRIPTION - LOCATION
LOCATION: ABDOMEN
LOCATION: ABDOMEN

## 2024-08-09 NOTE — LACTATION NOTE
Flange fit can be an important part of comfortable and efficient pumping.   Standard 24mm flanges may be ok, but new information suggests using a breastshield (flange) closest to your nipple diameter measurement maybe best.  Nipple diameter can fluctuate throughout breastfeeding duration.  Suggest ordering a flange insert variety pack (any brand).  The variety pack contains flange inserts in  13mm, 15mm, 17mm, 19mm and 21mm sizes.  Flange inserts will fit in ANY 24 mm hard flange no matter what brand the pump is.  This includes plug-in and wireless pumps.      ____ mm L     ____ mm R.       Based on your above measurements (exact measurement/+1-2 mm), suggest trying ____ mm flanges to start.  Some people prefer a hard sided flange.  Once you feel you have found your ideal fit, you can order a hard sided flange compatible with your pump.  You will likely have to order off brand and depending on your pump, a hard flange in a smaller size may not be available.  Olive oil or coconut oil can be used before pumping to help with friction.  Be very aware of centering nipple in the flange opening.  With a tighter fit it can take practice to get nipple centered properly.     Call [facility] with any questions.  [phone]    Wireless pumps are usually best for once your milk is already in.  Fast cycling stimulates let-down, slow cycling expresses available milk.  Always adjust vacuum (pull strength)  level to comfort.  Push the vacuum (+/-) level up until pull is a little uncomfortable and then back down until comfortable.  Pain does not equal milk.  Start pumping in the fast cycle for about 2 minutes to stimulate let-down.  Switch to longer pull expression mode after 2 minutes.  Majority of time should be in slower Expression Mode.  There are multiple settings that can be utilized with wireless pump.  These are the standard instructions.  [facility] [phone]      For  additional, brand specific, wireless pump information please check out instructional videos from [employer].  Search your pump brand to see instructions for use, tips and cleaning.

## 2024-08-09 NOTE — LACTATION NOTE
Lactation visit. First time mom. Infant in [facility] for hypoglycemia. Mom due to pump now. Full review given on pump parts and pump settings. Measured nipple diameter for flange fit at mom's request.  16mm L and 18mm R.  Discussed standard flanges may be ok, but new information states closer flange fit can be more comfortable and effective.  Mom can try flanges that came with pump and adjust as indicated.  Nipple diameter can fluctuate throughout breastfeeding duration.  Suggest trying 18 or 21mm flanges to start if standard 24mm do not work. Mom has 18 and 21mm flanges to try. Pump every 3 hours. Reviewed hands on pumping. Mom pumped ~2ml this time, reviewed collection and proper labeling for [facility] into syringes. Reviewed normal volume expectations with pumping. Can attempt latching as able. May take baby some time to learn to latch well. Post attempt, need to continue to pump and feed pumped milk plus formula supplementation as instructed by [facility]. Mom agreeable. All questions answered. Will continue to assist.

## 2024-08-09 NOTE — PROGRESS NOTES
Patient rodríguez removed at this time.  Alissa care provided.  Up to bedside and ambulated around the room.  Tolerated well.  Returned to bed.  Reminded to call for any needs and to attempt to void in the next few hours.  Patient acknowledged understanding.

## 2024-08-09 NOTE — PROGRESS NOTES
Progress Note                               Patient: Maris Gage MRN: [id number]  SSN: [id number]    YOB: 1990  Age: 34 y.o.  Sex: female      1 Day Post-Op     Subjective:     Doing well this morning. Overnight, baby had to [facility] to [facility] due to low sugars. She is tearful but understanding of situation. She is pumping and working on colostrum for baby. She denies any heavy bleeding. She has ambulated to restroom and voided. She reports pain is controlled. No further headache, visual disturbance.     Objective:     Patient Vitals for the past 18 hrs:   Temp Pulse Resp BP SpO2   24 0716 98.6 °F (37 °C) 96 18 (!) 116/95 99 %   24 0335 97.9 °F (36.6 °C) 96 18 111/66 98 %   24 2357 97.5 °F (36.4 °C) (!) 101 18 (!) 100/58 98 %   24 1911 98.8 °F (37.1 °C) 89 18 119/77 97 %   24 1812 97.7 °F (36.5 °C) 93 20 (!) 106/59 95 %   24 1715 -- 90 18 (!) 108/59 99 %   24 1700 -- 92 18 (!) 113/59 99 %   24 1645 -- 90 18 104/60 97 %   24 1629 -- (!) 103 18 100/62 99 %   24 1625 -- 82 18 107/60 99 %   24 1609 -- 100 18 (!) 105/59 99 %   24 1557 -- 71 18 (!) 103/59 96 %   24 1545 -- 92 18 101/60 96 %   24 1540 -- (!) 103 18 (!) 102/58 97 %   24 1535 -- 100 18 (!) 112/55 97 %   24 1530 -- (!) 101 -- (!) 107/55 96 %   24 1525 -- 100 18 (!) 112/59 92 %   24 1520 -- 97 18 (!) 111/58 92 %   24 1513 -- (!) 109 20 (!) 97/55 93 %   24 1511 98.1 °F (36.7 °C) (!) 109 20 (!) 125/44 95 %       Temp (24hrs), Av.1 °F (36.7 °C), Min:97.5 °F (36.4 °C), Max:98.8 °F (37.1 °C)      Physical Exam:    General: AAO x 3, no acute distress  Resp: Clear to auscultation  CV: Regular rate and rhythm  Abd: Soft, appropriately tender to palpation, fundus firm and below umbilicus  Inc: C/d/I, no warmth or erythema surrounding  Ext: No edema present     Lab/Data Review:  Post-op Hgb pending this AM    Assessment and Plan:     -

## 2024-08-09 NOTE — OP NOTE
[facility] [address]  [address]                            OPERATIVE REPORT      PATIENT NAME: ROSALINDA PHELPS                 : 1990  MED REC NO: [id number]                       ROOM: [id number]  ACCOUNT NO: [id number]                       ADMIT DATE: 2024  PROVIDER: Kareem Barnard MD    DATE OF SERVICE:  2024    PREOPERATIVE DIAGNOSES:  37+ week intrauterine pregnancy with onset of preeclampsia, breech presentation.    POSTOPERATIVE DIAGNOSES:  37+ week intrauterine pregnancy with onset of preeclampsia, breech presentation with an operative delivery at 1412 on 2024 with the delivery of a female infant with Apgars 8 and 8, weighing 3232 g (7 pounds 2 ounces).    PROCEDURES PERFORMED:  Primary low-transverse .    SURGEON:  Kareem Barnard MD    ASSISTANT:  OR staff.    ANESTHESIA:  Spinal.    ESTIMATED BLOOD LOSS:  700 mL.    SPECIMENS REMOVED:  None.    INTRAOPERATIVE FINDINGS:  Normal uterus, tubes, and ovaries and no reason for breech presentation palpated within the uterine cavity.     COMPLICATIONS:  None.    IMPLANTS:  None.      DESCRIPTION OF PROCEDURE:  After informed consent was obtained, the patient was taken to the OR and good working level obtained on her spinal.  She was prepped and draped in usual sterile fashion.  Time-out performed.  The patient did receive her Ancef.  Once noting good working level on her spinal, she was prepped and draped.  Pfannenstiel skin incision was made, carried down to the fascia, nicked in midline, extended out bilaterally, and dissected superiorly and inferiorly off the rectus muscles.  All bleeding controlled with electrocautery.  The peritoneum was then identified and elevated between two hemostats and then excised and extended up superiorly and down inferiorly.  A bladder flap was created.  Hysterotomy incision was then made with use of sharp and then blunt

## 2024-08-09 NOTE — ANESTHESIA POST-OP
Patient is POD 1 s/p  and received neuraxial morphine for post-op pain control.  /66   Pulse 96   Temp 97.9 °F (36.6 °C) (Oral)   Resp 18   Ht 1.626 m (5' 4\")   SpO2 98%   Breastfeeding Unknown , airway patent, patient appropriately hydrated and appears euvolemic.  She reports minimal incisional pain.  Patient reports minimal pruritis and minimal nausea.  Her lower extremities have returned to baseline neurologically.  She was satisfied with the anesthetic and reports no complications.  Continue current orders.

## 2024-08-10 PROCEDURE — 6370000000 HC RX 637 (ALT 250 FOR IP): Performed by: OBSTETRICS & GYNECOLOGY

## 2024-08-10 PROCEDURE — 1100000000 HC RM PRIVATE

## 2024-08-10 RX ORDER — IBUPROFEN 200 MG
CAPSULE ORAL 2 TIMES DAILY
Status: DISCONTINUED | OUTPATIENT
Start: 2024-08-10 | End: 2024-08-11 | Stop reason: HOSPADM

## 2024-08-10 RX ORDER — IBUPROFEN 200 MG
TABLET ORAL 2 TIMES DAILY
Status: DISCONTINUED | OUTPATIENT
Start: 2024-08-10 | End: 2024-08-10 | Stop reason: SDUPTHER

## 2024-08-10 RX ADMIN — OXYCODONE 10 MG: 5 TABLET ORAL at 05:56

## 2024-08-10 RX ADMIN — ACETAMINOPHEN 1000 MG: 500 TABLET, FILM COATED ORAL at 18:25

## 2024-08-10 RX ADMIN — OXYCODONE 10 MG: 5 TABLET ORAL at 10:29

## 2024-08-10 RX ADMIN — OXYCODONE 10 MG: 5 TABLET ORAL at 22:12

## 2024-08-10 RX ADMIN — DOCUSATE SODIUM 100 MG: 100 CAPSULE, LIQUID FILLED ORAL at 22:11

## 2024-08-10 RX ADMIN — POLYMYXIN B SULFATE, BACITRACIN ZINC, NEOMYCIN SULFATE: 5000; 3.5; 4 OINTMENT TOPICAL at 22:20

## 2024-08-10 RX ADMIN — IBUPROFEN 800 MG: 800 TABLET, FILM COATED ORAL at 22:11

## 2024-08-10 RX ADMIN — ACETAMINOPHEN 1000 MG: 500 TABLET, FILM COATED ORAL at 02:19

## 2024-08-10 RX ADMIN — OXYCODONE 10 MG: 5 TABLET ORAL at 14:17

## 2024-08-10 RX ADMIN — OXYCODONE 10 MG: 5 TABLET ORAL at 02:20

## 2024-08-10 RX ADMIN — DOCUSATE SODIUM 100 MG: 100 CAPSULE, LIQUID FILLED ORAL at 10:29

## 2024-08-10 RX ADMIN — OXYCODONE 10 MG: 5 TABLET ORAL at 18:26

## 2024-08-10 RX ADMIN — POLYMYXIN B SULFATE, BACITRACIN ZINC, NEOMYCIN SULFATE: 5000; 3.5; 4 OINTMENT TOPICAL at 13:34

## 2024-08-10 RX ADMIN — IBUPROFEN 800 MG: 800 TABLET, FILM COATED ORAL at 05:56

## 2024-08-10 RX ADMIN — IBUPROFEN 800 MG: 800 TABLET, FILM COATED ORAL at 14:17

## 2024-08-10 RX ADMIN — ACETAMINOPHEN 1000 MG: 500 TABLET, FILM COATED ORAL at 10:28

## 2024-08-10 ASSESSMENT — PAIN DESCRIPTION - DESCRIPTORS
DESCRIPTORS: BURNING;SHARP
DESCRIPTORS: SHARP;BURNING

## 2024-08-10 ASSESSMENT — PAIN DESCRIPTION - LOCATION
LOCATION: ABDOMEN
LOCATION: ABDOMEN

## 2024-08-10 ASSESSMENT — PAIN DESCRIPTION - ORIENTATION
ORIENTATION: LOWER
ORIENTATION: LOWER

## 2024-08-10 ASSESSMENT — PAIN SCALES - GENERAL
PAINLEVEL_OUTOF10: 8
PAINLEVEL_OUTOF10: 8

## 2024-08-10 NOTE — LACTATION NOTE
This note was copied from a baby's chart.  In to follow up with mom and infant. Mom was pumping and stated that she felt the suction was not as strong on the right side. Did trouble shoot and reconnected the tubing. With no change. Did change to a new pump. Mom stated that she thought the suction did improve. Mom does continue to offer infant the breast prior to pumping and is offering formula supplement. Lactation consultant will follow up tomorrow.

## 2024-08-10 NOTE — PROGRESS NOTES
Progress Note                               Patient: Maris Gage MRN: [id number]  SSN: [id number]    YOB: 1990  Age: 34 y.o.  Sex: female      2 Days Post-Op     Subjective:     Doing better this morning. She reports that pain is controlled. Area of skin disruption from tape that is uncomfortable. She denies any heavy bleeding. She is passing flatus. She denies any headache, visual disturbance.     Objective:     Patient Vitals for the past 18 hrs:   Temp Pulse Resp BP SpO2   08/10/24 1100 97.6 °F (36.4 °C) 90 16 131/89 99 %   08/10/24 0725 97.6 °F (36.4 °C) 95 16 108/81 96 %   08/10/24 0254 97.5 °F (36.4 °C) 79 16 110/76 96 %   24 2335 98.1 °F (36.7 °C) 91 18 122/88 97 %   24 1913 98.2 °F (36.8 °C) 100 18 109/86 97 %       Temp (24hrs), Av.8 °F (36.6 °C), Min:97.5 °F (36.4 °C), Max:98.2 °F (36.8 °C)      Physical Exam:    General: AAO x 3, no acute distress  Resp: Clear to auscultation  CV: Regular rate and rhythm  Abd: Soft, appropriately tender to palpation, fundus firm and below umbilicus  Inc: c/d/I. Area of skin disruption left mid-abdomen with 3-4 mm  Ext: No edema present       Lab/Data Review:  No new labs this AM    Assessment and Plan:     - Continue routine postoperative care  - Baby at bedside, stepped down from NICU yesterday  - Ointment ordered for skin disruption  - Anticipate d/c home tomorrow, POD 3

## 2024-08-10 NOTE — LACTATION NOTE
This note was copied from a baby's chart.  Mom asking about a nipple shield. Reviewed with her the recommendation of when to introduce the shield. Informed her that if infant is still not latching when she goes to the breast feeding center they will probably introduce the shield at that time.

## 2024-08-11 VITALS
DIASTOLIC BLOOD PRESSURE: 84 MMHG | HEIGHT: 64 IN | HEART RATE: 80 BPM | SYSTOLIC BLOOD PRESSURE: 117 MMHG | OXYGEN SATURATION: 99 % | TEMPERATURE: 97.7 F | RESPIRATION RATE: 16 BRPM

## 2024-08-11 PROCEDURE — 6370000000 HC RX 637 (ALT 250 FOR IP): Performed by: OBSTETRICS & GYNECOLOGY

## 2024-08-11 RX ORDER — OXYCODONE HYDROCHLORIDE 5 MG/1
5 TABLET ORAL EVERY 6 HOURS PRN
Qty: 28 TABLET | Refills: 0 | Status: SHIPPED | OUTPATIENT
Start: 2024-08-11 | End: 2024-08-18

## 2024-08-11 RX ORDER — PSEUDOEPHEDRINE HCL 30 MG
100 TABLET ORAL 2 TIMES DAILY
Qty: 60 CAPSULE | Refills: 1 | Status: SHIPPED | OUTPATIENT
Start: 2024-08-11 | End: 2024-09-10

## 2024-08-11 RX ORDER — IBUPROFEN 800 MG/1
800 TABLET ORAL EVERY 8 HOURS
Qty: 30 TABLET | Refills: 0 | Status: SHIPPED | OUTPATIENT
Start: 2024-08-11 | End: 2024-08-21

## 2024-08-11 RX ADMIN — IBUPROFEN 800 MG: 800 TABLET, FILM COATED ORAL at 05:34

## 2024-08-11 RX ADMIN — POLYMYXIN B SULFATE, BACITRACIN ZINC, NEOMYCIN SULFATE: 5000; 3.5; 4 OINTMENT TOPICAL at 10:14

## 2024-08-11 RX ADMIN — OXYCODONE 10 MG: 5 TABLET ORAL at 05:35

## 2024-08-11 RX ADMIN — DOCUSATE SODIUM 100 MG: 100 CAPSULE, LIQUID FILLED ORAL at 08:46

## 2024-08-11 RX ADMIN — ACETAMINOPHEN 1000 MG: 500 TABLET, FILM COATED ORAL at 01:26

## 2024-08-11 RX ADMIN — OXYCODONE 10 MG: 5 TABLET ORAL at 10:00

## 2024-08-11 RX ADMIN — OXYCODONE 10 MG: 5 TABLET ORAL at 01:26

## 2024-08-11 RX ADMIN — ACETAMINOPHEN 1000 MG: 500 TABLET, FILM COATED ORAL at 08:46

## 2024-08-11 NOTE — LACTATION NOTE
This note was copied from a baby's chart.  In to see mom and infant for discharge. Gave feeding plan for guidance at home. They verbalized understanding how to use. Completed hospital grade pump rental. Assisted her w/ trying to latch baby to both sides but infant could not stay on as mom has short nipple and baby appears to want more milk flow. Discussed trying nipple shield at first pediatrician/lactation appt at [address] if still having trouble latching next few days, encouraged feed and weigh there. Reviewed discharge info and how to manage period of engorgement. No further needs at this time.

## 2024-08-11 NOTE — DISCHARGE SUMMARY
Obstetrical Discharge Summary     Name: Maris Gage MRN: [id number]  SSN: [id number]    YOB: 1990  Age: 34 y.o.  Sex: female      Allergies: Dilaudid [hydromorphone hcl]    Admit Date: 2024    Discharge Date: 2024     Admitting Physician: Jason Jaquez MD     Attending Physician:  Kareem Barnard MD     * Admission Diagnoses: Preeclampsia, third trimester [O14.93]  Breech presentation on examination, other fetus [O32.1XX9]    * Discharge Diagnoses:   Information for the patient's :  Too, Baby GIRL Maris [[id number]]   @[id number]@     Additional Diagnoses:    Lab Results   Component Value Date/Time    ABORH A POSITIVE 2024 12:51 PM      Immunization History   Administered Date(s) Administered    TDaP, ADACEL (age 10y-64y), BOOSTRIX (age 10y+), IM, 0.5mL 2024       * Procedures:   Procedure(s):   SECTION    * Discharge Condition: Improved    * Hospital Course: Normal hospital course following the delivery.    * Disposition: Home    Discharge Medications:      Medication List        START taking these medications      docusate 100 MG Caps  Commonly known as: COLACE, DULCOLAX  Take 100 mg by mouth 2 times daily     ibuprofen 800 MG tablet  Commonly known as: ADVIL;MOTRIN  Take 1 tablet by mouth every 8 (eight) hours for 10 days     oxyCODONE 5 MG immediate release tablet  Commonly known as: ROXICODONE  Take 1 tablet by mouth every 6 hours as needed for Pain for up to 7 days. Max Daily Amount: 20 mg            CONTINUE taking these medications      Lexapro 20 MG tablet  Generic drug: escitalopram     PNV PO     SLOW FE PO            STOP taking these medications      metFORMIN 500 MG tablet  Commonly known as: GLUCOPHAGE               Where to Get Your Medications        These medications were sent to [facility] #[id number] - [place] - [address] [facility] -  [phone] - F [phone]  [address] [place], [place] [address] [address]      Phone: [phone]   docusate 100 MG  Caps  ibuprofen 800 MG tablet  oxyCODONE 5 MG immediate release tablet         * Follow-up Care/Patient Instructions:  Activity: activity as tolerated, no heavy lifting, pushing, pulling for 6 weeks, and no sex for 6 weeks  Diet: regular diet  Wound Care: as directed    Follow-up: 1 week for postpartum BP check      Progress Note                               Patient: Maris Gage MRN: [id number]  SSN: [id number]    YOB: 1990  Age: 34 y.o.  Sex: female      3 Days Post-Op     Subjective:     Doing better this morning. She reports that pain is controlled. Area of skin disruption from tape that is uncomfortable. She denies any heavy bleeding. She is passing flatus. She denies any headache. Seeing occasional floaters, much improved, seldom. No other signs/symptoms of PreE.    Objective:     Patient Vitals for the past 18 hrs:   Temp Pulse Resp BP SpO2   24 0748 97.7 °F (36.5 °C) 80 16 117/84 99 %   24 0305 98.2 °F (36.8 °C) 79 16 112/86 96 %   08/10/24 2300 98.2 °F (36.8 °C) 90 16 133/88 98 %   08/10/24 1904 97.9 °F (36.6 °C) 79 16 116/87 --       Temp (24hrs), Av °F (36.7 °C), Min:97.6 °F (36.4 °C), Max:98.3 °F (36.8 °C)      Physical Exam:    General: AAO x 3, no acute distress  Resp: Clear to auscultation  CV: Regular rate and rhythm  Abd: Soft, appropriately tender to palpation, fundus firm and below umbilicus  Inc: c/d/I. Area of skin disruption left mid-abdomen with 3-4 mm. Mild swelling present inferior to incision.   Ext: No edema present        Lab/Data Review:  No new labs    Assessment and Plan:     Patient appears to be having an uncomplicated post- course. Continue routine postoperative care and maternal education. Plan d/c home today, POD 3. She will follow-up in 1 week for BP check then 2 weeks for post-op incision check. Provided with routine return precautions.

## 2024-08-11 NOTE — LACTATION NOTE
This note was copied from a baby's chart.  Individualized Feeding Plan for Breastfeeding   Lactation Services [phone]    As much as possible, hold your baby on your chest so baby’s bare skin is against your bare skin with a blanket covering baby’s back, especially 30 minutes before it is time for baby to eat.    Watch for early feeding cues such as, licking lips, sucking motions, rooting, hands to mouth. Crying is a late feeding cue.      Feed your baby at least 8 times in 24 hours, or more if your baby is showing feeding cues.  If baby is sleepy put baby skin to skin and watch for hunger cues.  To rouse baby: unwrap, undress, massage hands, feet, & back, change diaper, gently change baby’s position from lying to sitting.   15-20 minutes on the first breast of active breastfeeding is considered a good feeding. Good, active breastfeeding is when baby is alert, tugging the nipple, their ear may move, and you can hear swallows.  Allow baby to finish the first side before changing sides.     Sleeping at the breast or only brief, light sucks should not be considered a good, full breastfeed.  At each feedin.  Baby needs to NURSE WELL x 15-20 minutes on at least first breast, burp and offer 2nd breast at every feeding.  If no sustained latch only attempt at breast for 10 minutes.     If baby does not latch on and feed well on at least one side, you should:      2. Double pump for 15 minutes with breast massage and compression.  Hand express for an additional 2-3 minutes per side. Pump after each feeding attempt or poor feeding, up to 8 times per day. If you are not putting baby to the breast you need to pump 8 times a day. Pump every 3 hours.       3. Give baby all of the breast milk you obtain using a straight syringe or  curved syringe.    If baby does NOT have enough wet and dirty diapers per day, is jaundiced/lethargic, or has significant weight loss AND you do NOT pump enough milk for each feeding  (per volume listed below), formula supplementation may need to be used. Call lactation department /pediatrician if you have concerns.     AVERAGE INTAKES OF COLOSTRUM BY HEALTHY  INFANTS:  Time  Day Intake (ml per feeding)  Based on 8 feedings per day.  1st 24 hrs  1 2-10 ml  24-48 hrs  2 5-15 ml  48-72 hrs  3 15-30 ml (0.5-1 oz)  72-96 hrs  4 30-45 ml (1-1.5oz)                          5-6      45-60 ml (1.5-2oz)                           7          70 ml (2.25oz) + more as desired      By day 7, baby will need 70 ml or 2.25 oz at each feeding based on 8 feedings per day & baby’s weight. (1oz = 30ml).  Total milk volume needed in 24 hours by Day 7 is 17-19 oz per day based on baby's birthweight of 7lb 2oz.   The more often baby eats, the less volume they need per feeding.  If baby is eating more often than the minimum of 8 times per day, they may take less per feeding.    Comments:  Use plan as needed if not latching well.  If giving formula after a breastfeed, will also need to pump for 10 minutes.  If baby does not latch and you just bottle feed, pump for 15 minutes.     Use feeding plan until follow up with pediatrician.      OUTPATIENT APPOINTMENT Suggested.  Outpatient services are located on the 4th floor at [facility]. Check in at the 4th floor registration desk (the same one you used when you came to have your baby).  Call for questions [phone]

## 2024-08-11 NOTE — DISCHARGE INSTRUCTIONS
Discharge instruction to follow:   Activity: Pelvis rest for 6 weeks     No heavy lifting over 15 lbs for 2 weeks     No driving for 2 weeks     No push/pull motion such as sweeping or vacuuming for 2 weeks     No tub baths for 6 weeks     section keep incision clean and dry, may shower as normal with soap and water.   Inspect incision every day for signs of infection listed below.  Continue to use willy-bottle with every void or bowel movement until comfortable stopping.  Change sanitary pad after each urination or bowel movement.    Call MD for the following:      Fever over 101 F; pain not relieved by medication; foul smelling vaginal discharge or increase in vaginal bleeding. Redness, swelling, or drainage from  incision.    Take medication as prescribed. Follow up with MD as order.         Section: What to Expect at Home  Your Recovery     A  section, or , is surgery to deliver your baby through a cut that the doctor makes in your lower belly and uterus. The cut is called an incision.  You may have some pain in your lower belly and need pain medicine for 1 to 2 weeks. You can expect some vaginal bleeding for several weeks. You will probably need about 6 weeks to fully recover.  It's important to take it easy while the incision heals. Avoid heavy lifting, strenuous activities, and exercises that strain the belly muscles while you recover. Ask a family member or friend for help with housework, cooking, and shopping.  This care sheet gives you a general idea about how long it will take for you to recover. But each person recovers at a different pace. Follow the steps below to get better as quickly as possible.  How can you care for yourself at home?  Activity    Rest when you feel tired. Getting enough sleep will help you recover.     Try to walk each day. Start by walking a little more than you did the day before. Bit by bit, increase the amount you walk. Walking boosts

## 2024-08-11 NOTE — CARE COORDINATION
Assessed patient due to hx depression/anxiety.  She is coping well and has all needs.  Spouse present and supportive.  Discussed and provided patient with  informational packet on  mood and anxiety disorders (resources/education).     provided education on [facility]  Home Visit and provided brochure.  Patient declined referral at this time but will consider and call us if she changes her mind.

## (undated) DEVICE — SUTURE MONOCRYL SZ 3-0 L36IN ABSRB UD L36MM CT-1 1/2 CIR Y944H

## (undated) DEVICE — SUTURE MONOCRYL SZ 1 L36IN ABSRB UD L36MM CT-1 1/2 CIR Y947H

## (undated) DEVICE — SUTURE VICRYL SZ 0 L36IN ABSRB UD L48MM CTX 1/2 CIR J978H

## (undated) DEVICE — SUTURE PDS II SZ 0 L27IN ABSRB VLT L36MM CT-1 1/2 CIR Z340H

## (undated) DEVICE — SUTURE PLN GUT SZ 2-0 L27IN ABSRB YELLOWISH TAN L40MM CT 853H

## (undated) DEVICE — SURGICAL PROCEDURE PACK C SECT CDS

## (undated) DEVICE — SOLUTION IRRIG 1000ML H2O STRL BLT

## (undated) DEVICE — AMD ANTIMICROBIAL NON-ADHERENT PAD,0.2% POLYHEXAMETHYLENE BIGUANIDE HCI (PHMB): Brand: TELFA

## (undated) DEVICE — Device: Brand: PORTEX

## (undated) DEVICE — KENDALL SCD EXPRESS SLEEVES, KNEE LENGTH, MEDIUM: Brand: KENDALL SCD

## (undated) DEVICE — ELECTRODE PT RET AD L9FT HI MOIST COND ADH HYDRGEL CORDED

## (undated) DEVICE — TUBING, SUCTION, 1/4" X 10', STRAIGHT: Brand: MEDLINE

## (undated) DEVICE — KIT URIN CATH L16FR BLLN 5ML INDWL STR TIP INF CTRL URIN

## (undated) DEVICE — SUTURE MONOCRYL SZ 4-0 L27IN ABSRB UD L19MM PS-2 1/2 CIR PRIM Y426H

## (undated) DEVICE — PENCIL ES L3M BTTN SWCH S STL HEX LOK BLDE ELECTRD HOLSTER

## (undated) DEVICE — TRAY PREP DRY W/ PREM GLV 2 APPL 6 SPNG 2 UNDPD 1 OVERWRAP

## (undated) DEVICE — STERILE POLYISOPRENE POWDER-FREE SURGICAL GLOVES: Brand: PROTEXIS

## (undated) DEVICE — SOLUTION IV 1000ML 0.9% SOD CHL

## (undated) DEVICE — AMD ANTIMICROBIAL GAUZE SPONGES,12 PLY USP TYPE VII, 0.2% POLYHEXAMETHYLENE BIGUANIDE HCI (PHMB): Brand: CURITY